# Patient Record
Sex: FEMALE | Race: WHITE | NOT HISPANIC OR LATINO | URBAN - METROPOLITAN AREA
[De-identification: names, ages, dates, MRNs, and addresses within clinical notes are randomized per-mention and may not be internally consistent; named-entity substitution may affect disease eponyms.]

---

## 2020-10-20 ENCOUNTER — APPOINTMENT (OUTPATIENT)
Dept: URBAN - METROPOLITAN AREA SURGERY 18 | Age: 60
Setting detail: DERMATOLOGY
End: 2020-10-21

## 2020-10-20 VITALS — TEMPERATURE: 97.3 F | SYSTOLIC BLOOD PRESSURE: 125 MMHG | DIASTOLIC BLOOD PRESSURE: 79 MMHG | HEART RATE: 87 BPM

## 2020-10-20 PROBLEM — C44.311 BASAL CELL CARCINOMA OF SKIN OF NOSE: Status: ACTIVE | Noted: 2020-10-20

## 2020-10-20 PROCEDURE — 17311 MOHS 1 STAGE H/N/HF/G: CPT

## 2020-10-20 PROCEDURE — 15260 FTH/GFT FR N/E/E/L 20 SQCM/<: CPT

## 2020-10-20 PROCEDURE — OTHER MOHS SURGERY: OTHER

## 2020-10-20 PROCEDURE — OTHER REFERRAL CORRESPONDENCE: OTHER

## 2020-10-20 PROCEDURE — 17312 MOHS ADDL STAGE: CPT

## 2020-10-20 NOTE — PROCEDURE: MOHS SURGERY
Body Location Override (Optional - Billing Will Still Be Based On Selected Body Map Location If Applicable): right nasal alar groove (\"right inferior medial malar cheek\")

## 2020-10-20 NOTE — HPI: PROCEDURE (MOHS)
Has The Growth Been Previously Biopsied?: has been previously biopsied
Body Location Override (Optional): right nasal alar groove (\"right inferior medial malar cheek\")
Year Removed: 1900

## 2020-10-20 NOTE — PROCEDURE: MOHS SURGERY
Current every day smoker Trilobed Flap Text: Given the location of the defect, inherent tension at the surgical site, and the proximity to free margins a bilobe transposition flap was deemed most appropriate for wound reconstruction. The risks, benefits, and possible outcomes of this procedure were discussed along with the risks, benefits, and possible outcomes of other options for wound repair (including but not limited to: complex closure, other flaps, grafts, and second intention). The patient verbalized understanding and consent to the outlined procedure. The patient verbalized understanding that intraoperative conditions may necessitate a change in the outlined procedure resulting in modification of the original surgical plan. This decision may be made at the discretion of the surgeon, and is due to factors subject to change or that are difficult to predict preoperatively. We also discussed that should the patient heal with a scar they find noticeable/unfavorable additional surgical procedures and/or referral to additional specialists may be required. The patient understands the healing process takes place over many months and that the \"final\" appearance of the scar line often takes a year or longer to achieve.\\n\\nUsing a sterile surgical marker, an appropriate trilobe transposition flap was drawn incorporating the defect and placing the expected incisions within the relaxed skin tension lines where possible. The surgical site and surrounding skin were prepped and draped in sterile fashion.  A single standing cone was removed at the expected pivot point of the transposition flap in the appropriate surgical plane, repositioning as necessary based upon local tension, proximity to free margins/other anatomic structures, and position of the relaxed skin tension lines. The flap (containing three lobes  by 45 degrees, each proportionate in size to the postop defect) was incised and raised in the appropriate surgical plane (just below the level of the nasalis musculature). The resulting defect was undermined widely and bilaterally in the appropriate surgical plane taking care to preserve local arteries, veins, nerves, and other structures of anatomic importance. This created a flap capable of transposing across the defect to close the wound under minimal tension, without distortion of adjacent free margins. Hemostasis was achieved and then the wound was sutured in layered fashion.

## 2020-10-28 ENCOUNTER — APPOINTMENT (OUTPATIENT)
Dept: URBAN - METROPOLITAN AREA SURGERY 18 | Age: 60
Setting detail: DERMATOLOGY
End: 2020-10-29

## 2020-10-28 DIAGNOSIS — Z48.817 ENCOUNTER FOR SURGICAL AFTERCARE FOLLOWING SURGERY ON THE SKIN AND SUBCUTANEOUS TISSUE: ICD-10-CM

## 2020-10-28 PROCEDURE — OTHER POST-OP WOUND CHECK: OTHER

## 2020-10-28 PROCEDURE — 99024 POSTOP FOLLOW-UP VISIT: CPT

## 2020-10-28 ASSESSMENT — LOCATION DETAILED DESCRIPTION DERM: LOCATION DETAILED: RIGHT NASAL ALAR GROOVE

## 2020-10-28 ASSESSMENT — LOCATION SIMPLE DESCRIPTION DERM: LOCATION SIMPLE: RIGHT NOSE

## 2020-10-28 ASSESSMENT — LOCATION ZONE DERM: LOCATION ZONE: NOSE

## 2020-10-28 NOTE — PROCEDURE: POST-OP WOUND CHECK
Add 65301 Cpt? (Important Note: In 2017 The Use Of 70378 Is Being Tracked By Cms To Determine Future Global Period Reimbursement For Global Periods): yes
Additional Comments: Both graft and donor sites evaluated. Graft site bandaged appropriately and patient instructed to keep site dry for 1 more week. Patient to follow up in 8 days.
Detail Level: Detailed
Wound Evaluated By: Dr. Ike Guzman

## 2020-11-05 ENCOUNTER — APPOINTMENT (OUTPATIENT)
Dept: URBAN - METROPOLITAN AREA SURGERY 18 | Age: 60
Setting detail: DERMATOLOGY
End: 2020-11-09

## 2020-11-05 VITALS — TEMPERATURE: 96.6 F

## 2020-11-05 DIAGNOSIS — Z48.817 ENCOUNTER FOR SURGICAL AFTERCARE FOLLOWING SURGERY ON THE SKIN AND SUBCUTANEOUS TISSUE: ICD-10-CM

## 2020-11-05 PROCEDURE — OTHER POST-OP WOUND CHECK: OTHER

## 2020-11-05 PROCEDURE — 99024 POSTOP FOLLOW-UP VISIT: CPT

## 2020-11-05 ASSESSMENT — LOCATION ZONE DERM: LOCATION ZONE: NOSE

## 2020-11-05 ASSESSMENT — LOCATION SIMPLE DESCRIPTION DERM: LOCATION SIMPLE: RIGHT NOSE

## 2020-11-05 ASSESSMENT — LOCATION DETAILED DESCRIPTION DERM: LOCATION DETAILED: RIGHT NASAL ALAR GROOVE

## 2020-11-05 NOTE — PROCEDURE: POST-OP WOUND CHECK
Wound Evaluated By: Dr. Ike Guzman
Detail Level: Detailed
Add 93610 Cpt? (Important Note: In 2017 The Use Of [id number] Is Being Tracked By Cms To Determine Future Global Period Reimbursement For Global Periods): yes
Additional Comments: Graft is well vascularized, elevated with respect to surrounding skin. Discussed contraction that should bring graft level with surrounding skin by 6-12 weeks postop. Reviewed timeline and options for revision to blend graft into surrounding background skin (if needed). May get area wet and begin daily dressing changes until well healed. Follow up 8 weeks.

## 2021-01-05 ENCOUNTER — APPOINTMENT (OUTPATIENT)
Dept: URBAN - METROPOLITAN AREA SURGERY 18 | Age: 61
Setting detail: DERMATOLOGY
End: 2021-01-06

## 2021-01-05 VITALS — TEMPERATURE: 97.1 F

## 2021-01-05 DIAGNOSIS — Z48.817 ENCOUNTER FOR SURGICAL AFTERCARE FOLLOWING SURGERY ON THE SKIN AND SUBCUTANEOUS TISSUE: ICD-10-CM

## 2021-01-05 PROCEDURE — 99024 POSTOP FOLLOW-UP VISIT: CPT

## 2021-01-05 PROCEDURE — OTHER POST-OP WOUND CHECK: OTHER

## 2021-01-05 ASSESSMENT — LOCATION ZONE DERM: LOCATION ZONE: NOSE

## 2021-01-05 ASSESSMENT — LOCATION DETAILED DESCRIPTION DERM: LOCATION DETAILED: RIGHT NASAL ALA

## 2021-01-05 ASSESSMENT — LOCATION SIMPLE DESCRIPTION DERM: LOCATION SIMPLE: RIGHT NOSE

## 2021-01-05 NOTE — PROCEDURE: POST-OP WOUND CHECK
Add 48577 Cpt? (Important Note: In 2017 The Use Of [id number] Is Being Tracked By Cms To Determine Future Global Period Reimbursement For Global Periods): yes
Wound Evaluated By: Dr. Ike Guzman
Additional Comments: Appropriately healed surgical graft. Discussed daily massage to both graft and donor site. Discussed importance of sun protection. Discussed possible dermabrasion (and/or injection) at next visit. Follow up in eight weeks.
Detail Level: Detailed